# Patient Record
Sex: FEMALE | Race: WHITE
[De-identification: names, ages, dates, MRNs, and addresses within clinical notes are randomized per-mention and may not be internally consistent; named-entity substitution may affect disease eponyms.]

---

## 2023-02-25 ENCOUNTER — HOSPITAL ENCOUNTER (EMERGENCY)
Dept: HOSPITAL 12 - ER | Age: 73
LOS: 1 days | Discharge: HOME | End: 2023-02-26
Payer: COMMERCIAL

## 2023-02-25 VITALS — BODY MASS INDEX: 37.3 KG/M2 | WEIGHT: 190 LBS | HEIGHT: 60 IN

## 2023-02-25 DIAGNOSIS — R03.0: ICD-10-CM

## 2023-02-25 DIAGNOSIS — Z88.2: ICD-10-CM

## 2023-02-25 DIAGNOSIS — Z88.0: ICD-10-CM

## 2023-02-25 DIAGNOSIS — L03.116: Primary | ICD-10-CM

## 2023-02-25 PROCEDURE — A4663 DIALYSIS BLOOD PRESSURE CUFF: HCPCS

## 2023-02-25 PROCEDURE — 99284 EMERGENCY DEPT VISIT MOD MDM: CPT

## 2023-02-25 PROCEDURE — 96365 THER/PROPH/DIAG IV INF INIT: CPT

## 2023-02-26 VITALS — DIASTOLIC BLOOD PRESSURE: 77 MMHG | SYSTOLIC BLOOD PRESSURE: 138 MMHG

## 2023-02-26 NOTE — NUR
Patient discharged to home in stable condition.  Written and verbal after care 
instructions given. 

Patient verbalizes understanding of instructions. Stressed follow up or return 
to ER for worsening s/s.

Patient out of ER with steady gait, no acute signs of distress, VSS, all 
belongings taken, IV site discontinued, to be driven home by  via 
private vehicle.